# Patient Record
Sex: FEMALE | Race: WHITE
[De-identification: names, ages, dates, MRNs, and addresses within clinical notes are randomized per-mention and may not be internally consistent; named-entity substitution may affect disease eponyms.]

---

## 2017-01-09 ENCOUNTER — HOSPITAL ENCOUNTER (OUTPATIENT)
Dept: HOSPITAL 45 - C.LAB1850 | Age: 77
Discharge: HOME | End: 2017-01-09
Attending: FAMILY MEDICINE
Payer: COMMERCIAL

## 2017-01-09 DIAGNOSIS — E03.9: Primary | ICD-10-CM

## 2017-02-22 ENCOUNTER — HOSPITAL ENCOUNTER (EMERGENCY)
Dept: HOSPITAL 45 - C.EDB | Age: 77
Discharge: HOME | End: 2017-02-22
Payer: COMMERCIAL

## 2017-02-22 VITALS
BODY MASS INDEX: 25.56 KG/M2 | WEIGHT: 149.69 LBS | WEIGHT: 149.69 LBS | HEIGHT: 64.02 IN | HEIGHT: 64.02 IN | BODY MASS INDEX: 25.56 KG/M2

## 2017-02-22 VITALS — HEART RATE: 65 BPM | SYSTOLIC BLOOD PRESSURE: 142 MMHG | OXYGEN SATURATION: 97 % | DIASTOLIC BLOOD PRESSURE: 68 MMHG

## 2017-02-22 VITALS — OXYGEN SATURATION: 97 %

## 2017-02-22 VITALS — TEMPERATURE: 97.34 F
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
## 2017-02-22 DIAGNOSIS — Z98.1: ICD-10-CM

## 2017-02-22 DIAGNOSIS — S02.2XXA: Primary | ICD-10-CM

## 2017-02-22 DIAGNOSIS — Z79.899: ICD-10-CM

## 2017-02-22 DIAGNOSIS — E03.9: ICD-10-CM

## 2017-02-22 DIAGNOSIS — Z88.2: ICD-10-CM

## 2017-02-22 LAB
ALP SERPL-CCNC: 71 U/L (ref 45–117)
ALT SERPL-CCNC: 21 U/L (ref 12–78)
ANION GAP SERPL CALC-SCNC: 7 MMOL/L (ref 3–11)
AST SERPL-CCNC: 14 U/L (ref 15–37)
BASOPHILS # BLD: 0.03 K/UL (ref 0–0.2)
BASOPHILS NFR BLD: 0.4 %
BUN SERPL-MCNC: 10 MG/DL (ref 7–18)
BUN/CREAT SERPL: 13.5 (ref 10–20)
CALCIUM SERPL-MCNC: 8.7 MG/DL (ref 8.5–10.1)
CHLORIDE SERPL-SCNC: 105 MMOL/L (ref 98–107)
CKMB/CK RATIO: 1.1 (ref 0–3)
CO2 SERPL-SCNC: 27 MMOL/L (ref 21–32)
COMPLETE: YES
CREAT CL PREDICTED SERPL C-G-VRATE: 60.6 ML/MIN
CREAT SERPL-MCNC: 0.76 MG/DL (ref 0.6–1.2)
EOSINOPHIL NFR BLD AUTO: 400 K/UL (ref 130–400)
GLUCOSE SERPL-MCNC: 91 MG/DL (ref 70–99)
HCT VFR BLD CALC: 39.5 % (ref 37–47)
IG%: 0.3 %
IMM GRANULOCYTES NFR BLD AUTO: 24.7 %
LYMPHOCYTES # BLD: 1.84 K/UL (ref 1.2–3.4)
MCH RBC QN AUTO: 31.8 PG (ref 25–34)
MCHC RBC AUTO-ENTMCNC: 33.4 G/DL (ref 32–36)
MCV RBC AUTO: 95.2 FL (ref 80–100)
MONOCYTES NFR BLD: 6.6 %
NEUTROPHILS # BLD AUTO: 5.2 %
NEUTROPHILS NFR BLD AUTO: 62.8 %
PMV BLD AUTO: 9.1 FL (ref 7.4–10.4)
POTASSIUM SERPL-SCNC: 4 MMOL/L (ref 3.5–5.1)
RBC # BLD AUTO: 4.15 M/UL (ref 4.2–5.4)
SODIUM SERPL-SCNC: 139 MMOL/L (ref 136–145)
WBC # BLD AUTO: 7.44 K/UL (ref 4.8–10.8)

## 2017-02-22 NOTE — DIAGNOSTIC IMAGING REPORT
RIGHT FOREARM 2 VIEWS ROUTINE



CLINICAL HISTORY: Pt c/o Rt arm pain

Right pain



COMPARISON: None.



DISCUSSION: The bones and joint spaces appear intact. There is no evidence of

fracture, dislocation or bony disease. There is no evidence for soft tissue

swelling.



IMPRESSION: Negative study.







Electronically signed by:  Jad Bolden M.D.

2/22/2017 1:58 PM



Dictated Date/Time:  2/22/2017 1:57 PM

## 2017-02-22 NOTE — DIAGNOSTIC IMAGING REPORT
HEAD CT NONCONTRAST



CT DOSE: 720.95 mGycm



HISTORY: Trauma  Pt c/o fall



TECHNIQUE: Multiaxial CT images of the head were performed without the use of

intravenous contrast.



Comparison: None.



Findings: The paranasal sinuses and mastoid air cells are clear. The calvarium

and skull base are intact. The ventricles and sulci are within normal limits.

There is no mass, hematoma, midline shift, or acute infarct. Mild frontal

atrophy. Mild chronic small vessel change of aging



Impression:

No acute intracranial abnormality. . Age-related change.







Electronically signed by:  Jad Bolden M.D.

2/22/2017 1:09 PM



Dictated Date/Time:  2/22/2017 1:07 PM

## 2017-02-22 NOTE — EMERGENCY ROOM VISIT NOTE
History


Report prepared by Cristalibroselyn:  Kelvin Rust


Under the Supervision of:  Dr. Williams Walter M.D.


First contact with patient:  11:51


Chief Complaint:  FALL


Stated Complaint:  FELL, NOSE AND RT ARM PAIN





History of Present Illness


The patient is a 76 year old female who presents to the Emergency Room with 

complaints of an acute fall that occurred just prior to arrival. The patient 

was walking her dog when she fell. She is not sure if she tripped and notes 

that she may have passed out. The patient hit her nose, which is now swollen. 

She is able to breath through her nose. She also complains of a headache and 

right arm pain. She denies right upper extremity numbness. The patient is not 

on any blood thinners.





   Source of History:  patient


   Onset:  prior to arrival


   Position:  other (global)


   Quality:  other (fall)


   Timing:  other (acute)


   Associated Symptoms:  + headache, No numbness





Review of Systems


See HPI for pertinent positives & negatives. A total of 10 systems reviewed and 

were otherwise negative.





Past Medical & Surgical


Medical Problems:


(1) Hypothyroidism


Surgical Problems:


(1) H/O spinal fusion








Family History





No pertinent family history





Social History


Smoking Status:  Never Smoker


Marital Status:  


Housing Status:  lives with family





Current/Historical Medications


Scheduled


Calcium Carbonate-Vitamin D (Calcium), 1 TAB PO QAM


Cholecalciferol (Vitamin D), 1 TAB PO QAM


Cyanocobalamin (Vitamin B-12), 1 TAB PO QAM


Levothyroxine Sodium (Levothyroxine Sodium), 1 TAB PO QAM





Scheduled PRN


Meloxicam (Mobic), 7.5 MG PO DAILY PRN for BACK PAIN





Allergies


Coded Allergies:  


     Sulfa Antibiotics (Verified  Adverse Reaction, Unknown, HIVES, 2/22/17)


Uncoded Allergies:  


     SULFA (Allergy, Unknown, 9/3/04)





Physical Exam


Vital Signs











  Date Time  Temp Pulse Resp B/P Pulse Ox O2 Delivery O2 Flow Rate FiO2


 


2/22/17 14:39  65 18 142/68 97   


 


2/22/17 12:34  62      


 


2/22/17 12:24  67 16 148/63 97 Room Air  


 


2/22/17 12:19     97 Room Air  


 


2/22/17 12:19     97 Room Air  


 


2/22/17 11:35 36.3 78 18 135/81 92 Room Air  











Physical Exam


GENERAL: Patient is a healthy-appearing well-nourished


HEAD: Normocephalic atraumatic


EYES: Ocular movements intact pupils equal and react to light


OROPHARYNX mucous membranes are moist no exudates present no erythema or edema 

present


NECK: Supple no nuchal rigidity


CHEST: Good equal expansion


LUNGS: Clear and equal to auscultation


CARDIAC: Normal S1 and S2


ABDOMEN: Soft nontender no guarding


BACK: No CVA tenderness


EXTREMITIES: No pain upon palpation normal muscle strength in all groups no 

clubbing cyanosis or edema


NEURO: Patient is following commands is answering questions appropriately. 

Alert and oriented x3 Cranial Nerves 2-12 grossly intact





Medical Decision & Procedures


ER Provider


Diagnostic Interpretation:


Radiology results as stated below per my review and radiologist interpretation:





RIGHT FOREARM 2 VIEWS ROUTINE





CLINICAL HISTORY: Pt c/o Rt arm pain


Right pain





COMPARISON: None.





DISCUSSION: The bones and joint spaces appear intact. There is no evidence of


fracture, dislocation or bony disease. There is no evidence for soft tissue


swelling.





IMPRESSION: Negative study.











Electronically signed by:  Jad Bolden M.D.


2/22/2017 1:58 PM





Dictated Date/Time:  2/22/2017 1:57 PM








HEAD CT NONCONTRAST





CT DOSE: 720.95 mGycm





HISTORY: Trauma  Pt c/o fall





TECHNIQUE: Multiaxial CT images of the head were performed without the use of


intravenous contrast.





Comparison: None.





Findings: The paranasal sinuses and mastoid air cells are clear. The calvarium


and skull base are intact. The ventricles and sulci are within normal limits.


There is no mass, hematoma, midline shift, or acute infarct. Mild frontal


atrophy. Mild chronic small vessel change of aging





Impression:


No acute intracranial abnormality. . Age-related change.











Electronically signed by:  Jad Bolden M.D.


2/22/2017 1:09 PM





Dictated Date/Time:  2/22/2017 1:07 PM








MAXILLOFACIAL CT





CT DOSE: 551.90 mGycm





HISTORY: Trauma  Pt c/o nose fx





TECHNIQUE: Multiaxial CT images of the maxillofacial region were performed and


reformatted in the coronal plane without the use of contrast.





COMPARISON:  None.





FINDINGS: Nondisplaced cortical fracture tip nasal bones. All remaining osseous


structures are unremarkable. Major sinuses are clear. The orbital margins are


intact.





IMPRESSION:  


Nondisplaced cortical fracture tip nasal bones. Study is otherwise negative.











Electronically signed by:  Jad Bolden M.D.


2/22/2017 1:11 PM





Dictated Date/Time:  2/22/2017 1:09 PM





Laboratory Results


2/22/17 12:22








Red Blood Count 4.15, Mean Corpuscular Volume 95.2, Mean Corpuscular Hemoglobin 

31.8, Mean Corpuscular Hemoglobin Concent 33.4, Mean Platelet Volume 9.1, 

Neutrophils (%) (Auto) 62.8, Lymphocytes (%) (Auto) 24.7, Monocytes (%) (Auto) 

6.6, Eosinophils (%) (Auto) 5.2, Basophils (%) (Auto) 0.4, Neutrophils # (Auto) 

4.67, Lymphocytes # (Auto) 1.84, Monocytes # (Auto) 0.49, Eosinophils # (Auto) 

0.39, Basophils # (Auto) 0.03





2/22/17 12:22

















Test


  2/22/17


12:16 2/22/17


12:22


 


Bedside Glucose


  80 mg/dl


(70-90) 


 


 


White Blood Count


  


  7.44 K/uL


(4.8-10.8)


 


Red Blood Count


  


  4.15 M/uL


(4.2-5.4)


 


Hemoglobin


  


  13.2 g/dL


(12.0-16.0)


 


Hematocrit  39.5 % (37-47) 


 


Mean Corpuscular Volume


  


  95.2 fL


()


 


Mean Corpuscular Hemoglobin


  


  31.8 pg


(25-34)


 


Mean Corpuscular Hemoglobin


Concent 


  33.4 g/dl


(32-36)


 


Platelet Count


  


  400 K/uL


(130-400)


 


Mean Platelet Volume


  


  9.1 fL


(7.4-10.4)


 


Neutrophils (%) (Auto)  62.8 % 


 


Lymphocytes (%) (Auto)  24.7 % 


 


Monocytes (%) (Auto)  6.6 % 


 


Eosinophils (%) (Auto)  5.2 % 


 


Basophils (%) (Auto)  0.4 % 


 


Neutrophils # (Auto)


  


  4.67 K/uL


(1.4-6.5)


 


Lymphocytes # (Auto)


  


  1.84 K/uL


(1.2-3.4)


 


Monocytes # (Auto)


  


  0.49 K/uL


(0.11-0.59)


 


Eosinophils # (Auto)


  


  0.39 K/uL


(0-0.5)


 


Basophils # (Auto)


  


  0.03 K/uL


(0-0.2)


 


RDW Standard Deviation


  


  47.1 fL


(36.4-46.3)


 


RDW Coefficient of Variation


  


  13.6 %


(11.5-14.5)


 


Immature Granulocyte % (Auto)  0.3 % 


 


Immature Granulocyte # (Auto)


  


  0.02 K/uL


(0.00-0.02)


 


Anion Gap


  


  7.0 mmol/L


(3-11)


 


Est Creatinine Clear Calc


Drug Dose 


  60.6 ml/min 


 


 


Estimated GFR (


American) 


  88.9 


 


 


Estimated GFR (Non-


American 


  76.7 


 


 


BUN/Creatinine Ratio  13.5 (10-20) 


 


Calcium Level


  


  8.7 mg/dl


(8.5-10.1)


 


Total Bilirubin


  


  0.4 mg/dl


(0.2-1)


 


Direct Bilirubin


  


  < 0.1 mg/dl


(0-0.2)


 


Aspartate Amino Transf


(AST/SGOT) 


  14 U/L (15-37) 


 


 


Alanine Aminotransferase


(ALT/SGPT) 


  21 U/L (12-78) 


 


 


Alkaline Phosphatase


  


  71 U/L


()


 


Total Creatine Kinase


  


  180 U/L


()


 


Creatine Kinase MB


  


  2.0 ng/ml


(0.5-3.6)


 


Creatine Kinase MB Ratio  1.1 (0-3.0) 


 


Troponin I


  


  < 0.015 ng/ml


(0-0.045)


 


Total Protein


  


  7.4 gm/dl


(6.4-8.2)


 


Albumin


  


  3.8 gm/dl


(3.4-5.0)





Labs reviewed by ED physician.





Medications Administered











 Medications


  (Trade)  Dose


 Ordered  Sig/Elin


 Route  Start Time


 Stop Time Status Last Admin


Dose Admin


 


 Sodium Chloride


  (Nss 1000ml)  1,000 ml @ 


 999 mls/hr  Q1H1M STAT


 IV  2/22/17 12:00


 2/22/17 13:00 DC 2/22/17 12:24


999 MLS/HR











ECG


Indication:  other (fall)


Rate (beats per minute):  61


Rhythm:  normal sinus


Findings:  no acute ischemic change, no ectopy, other (normal EKG)





ED Course


1155: Past medical records reviewed. The patient was evaluated in room A12b. A 

complete history and physical examination was performed. 





1200: NSS 1000 ml @ 999 mls/hr.





1410: Reassessed the patient. Discussed the findings with her. She verbalized 

understanding and agreement of the treatment plan. The patient is ready for 

discharge.





Medical Decision


Differential diagnosis:


Etiologies such as fracture, dislocation, intra-abdominal, pneumothorax, 

intrathoracic , intracranial, neurologic, as well as other traumatic 

pathologies were entertained.





This is a 76-year-old female who presents emergency department after fall at 

home.  Patient believes she tripped over uneven sidewalk.  The patient given 

normal saline bolus.  She is requesting ibuprofen in the emergency department 

which she was given.  She does not appear to have any fracture to her right arm 

however she was placed in a sling due to continued pain.  In addition the 

patient also has nasal bone fractures.  I discussed following up with ENT for 

the nasal bone fractures with the patient as well as orthopedics for her arm.  

Patient was in agreement with the treatment plan.





Impression





 Primary Impression:  


 Fall


 Additional Impressions:  


 Right forearm pain


 Nasal fracture





Scribe Attestation


The scribe's documentation has been prepared under my direction and personally 

reviewed by me in its entirety. I confirm that the note above accurately 

reflects all work, treatment, procedures, and medical decision making performed 

by me.





Departure Information


Dispostion


Home / Self-Care





Referrals


Shaneka Magallon DO (PCP)





Forms


HOME CARE DOCUMENTATION FORM,                                                 

               IMPORTANT VISIT INFORMATION, School Instructions,       


   Work Instructions





Patient Instructions


Broken Nose - [facility], ED Contusion Upper Ext, My [facility]





Additional Instructions





Follow up with DR Hawkins's office


Follow up with DR Redding's office








Take 600 mg Ibuprofen every 6 hours














You have been examined and treated today on an emergency basis only. This is 

not a substitute for, or an effort to provide, complete comprehensive medical 

care. It is impossible to recognize and treat all injuries or illnesses in a 

single emergency department visit. It is therefore important that you follow up 

closely with Dr Magallon.  Call as soon as possible for an appointment.  





Thank you for your time and consideration.  I look forward to speaking with you 

again soon.  Please don't hesitate to call us if you have any questions.





Problem Qualifiers








 Primary Impression:  


 Fall


 Encounter type:  initial encounter  Qualified Codes:  W19.XXXA - Unspecified 

fall, initial encounter


 Additional Impressions:  


 Nasal fracture


 Encounter type:  initial encounter  Fracture type:  closed  Qualified Codes:  

S02.2XXA - Fracture of nasal bones, initial encounter for closed fracture

## 2017-02-22 NOTE — DIAGNOSTIC IMAGING REPORT
MAXILLOFACIAL CT



CT DOSE: 551.90 mGycm



HISTORY: Trauma  Pt c/o nose fx



TECHNIQUE: Multiaxial CT images of the maxillofacial region were performed and

reformatted in the coronal plane without the use of contrast.



COMPARISON:  None.



FINDINGS: Nondisplaced cortical fracture tip nasal bones. All remaining osseous

structures are unremarkable. Major sinuses are clear. The orbital margins are

intact.



IMPRESSION:  

Nondisplaced cortical fracture tip nasal bones. Study is otherwise negative.







Electronically signed by:  Jad Bolden M.D.

2/22/2017 1:11 PM



Dictated Date/Time:  2/22/2017 1:09 PM

## 2017-04-20 ENCOUNTER — HOSPITAL ENCOUNTER (EMERGENCY)
Dept: HOSPITAL 45 - C.EDB | Age: 77
Discharge: HOME | End: 2017-04-20
Payer: COMMERCIAL

## 2017-04-20 VITALS
WEIGHT: 153 LBS | BODY MASS INDEX: 26.12 KG/M2 | WEIGHT: 153 LBS | HEIGHT: 64.02 IN | BODY MASS INDEX: 26.12 KG/M2 | HEIGHT: 64.02 IN

## 2017-04-20 VITALS — DIASTOLIC BLOOD PRESSURE: 72 MMHG | HEART RATE: 78 BPM | SYSTOLIC BLOOD PRESSURE: 128 MMHG | OXYGEN SATURATION: 97 %

## 2017-04-20 VITALS — TEMPERATURE: 98.24 F

## 2017-04-20 DIAGNOSIS — Z88.2: ICD-10-CM

## 2017-04-20 DIAGNOSIS — Z98.1: ICD-10-CM

## 2017-04-20 DIAGNOSIS — Z87.891: ICD-10-CM

## 2017-04-20 DIAGNOSIS — S73.101A: ICD-10-CM

## 2017-04-20 DIAGNOSIS — Z79.899: ICD-10-CM
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
## 2017-04-20 DIAGNOSIS — T14.8: ICD-10-CM

## 2017-04-20 DIAGNOSIS — S93.401A: Primary | ICD-10-CM

## 2017-04-20 DIAGNOSIS — E03.9: ICD-10-CM

## 2017-04-20 NOTE — DIAGNOSTIC IMAGING REPORT
RIGHT ANKLE MIN 3 VIEWS ROUTINE



CLINICAL HISTORY: fall, right ankle lateral pain/swelling

Right trauma. Pain. Findings of



COMPARISON: None.



DISCUSSION: Findings of an old healed fracture of the distal fibular shaft. No

acute bony abnormality. Ankle mortise is aligned anatomically. Cortical margins

are intact. There is no evidence for soft tissue swelling.



IMPRESSION: No acute bony abnormality.







Electronically signed by:  Jad Bolden M.D.

4/20/2017 10:08 AM



Dictated Date/Time:  4/20/2017 10:08 AM

## 2017-04-20 NOTE — EMERGENCY ROOM VISIT NOTE
History


Report prepared by Dary:  Sophia Alfonso


Under the Supervision of:  Dr. Zachary Michaels M.D.


First contact with patient:  09:26


Chief Complaint:  FALL


Stated Complaint:  FALL-RIGHT ANKLE, KNEE AND HIP





History of Present Illness


The patient is a 76 year old female who presents to the Emergency Room with 

complaints of a sudden fall that occurred just prior to arrival.  She currently 

rates her discomfort as a 3/10 in severity.  The patient states that today she 

was walking too fast and caught her shoe on the ground causing the fall.  She 

states that the same thing happened two months ago with the same pair of shoes.

  The patient states that she was able to get up and walk after the fall.  The 

patient notes that she twisted her right ankle.  She associates right ankle pain

, right knee pain, and right hip pain with her symptoms today.  The patient 

denies hitting her head.  She denies any headache, neck pain, lightheadedness, 

or chest pain.  The patient denies being on blood thinners.  She denies any 

recent illnesses.





   Source of History:  patient


   Onset:  prior to arrival


   Position:  other (global)


   Symptom Intensity:  3/10


   Quality:  other (fall)


   Timing:  other (sudden)


   Associated Symptoms:  No chest pain, No headache, No neck pain


Note:


Associated Symptoms: right ankle pain, right knee pain, right hip pain





Review of Systems


See HPI for pertinent positives & negatives. A total of 6 systems reviewed and 

were otherwise negative.





Past Medical & Surgical


Medical Problems:


(1) Hypothyroidism


Surgical Problems:


(1) H/O spinal fusion








Family History





No pertinent family history





Social History


Smoking Status:  Former Smoker


Marital Status:  


Housing Status:  lives with family


Occupation Status:  retired





Current/Historical Medications


Scheduled


Calcium Carbonate-Vitamin D (Calcium), 1 TAB PO QAM


Cholecalciferol (Vitamin D), 1 TAB PO QAM


Cyanocobalamin (Vitamin B-12), 1 TAB PO QAM


Levothyroxine Sodium (Levothyroxine Sodium), 1 TAB PO QAM


Lorazepam (Ativan), 0.5 MG PO PRN





Scheduled PRN


Meloxicam (Mobic), 7.5 MG PO DAILY PRN for BACK PAIN





Allergies


Coded Allergies:  


     Sulfa Antibiotics (Verified  Adverse Reaction, Unknown, HIVES, 4/20/17)





Physical Exam


Vital Signs











  Date Time  Temp Pulse Resp B/P Pulse Ox O2 Delivery O2 Flow Rate FiO2


 


4/20/17 10:23  78 16 128/72 97   


 


4/20/17 09:18 36.8 81 18 132/73 93 Room Air  











Physical Exam


GENERAL: Patient is well appearing and in minimal distress.


HEENT: No acute trauma, normocephalic atraumatic, mucous membranes moist, no 

nasal congestion, no scleral icterus.


NECK: No stridor, no adenopathy, no meningismus, trachea is midline.


LUNGS: No dyspnea. Clear to auscultation and equal bilaterally. No wheeze, no 

rhonchi.


HEART: Regular rate and rhythm.  No murmurs, rubs, gallops appreciated.


ABDOMEN: Soft, nontender, bowel sounds positive, no masses appreciated, no 

peritonitis.


BACK: Scaring of low back from previous surgery.  No tenderness. No midline 

tenderness, no CVA tenderness


EXTREMITIES: Mild swelling below right ankle of the lateral malleolus with mild 

tenderness to palpation.  Mild tenderness to palpation over right posterior hip.


NEUROLOGIC: Alert and oriented, no acute motor or sensory deficits, no focal 

weakness, cranial nerves grossly intact.


SKIN: No rash, no jaundice, no diaphoresis.





Medical Decision & Procedures


ER Provider


Diagnostic Interpretation:


X ray results are stated below per my interpretation and the radiologist's 

interpretation. 





RIGHT HIP 2 VIEWS





CLINICAL HISTORY: Fall with right hip pain.





FINDINGS: AP and frog-leg views of the right hip are compared to study dated


11/20/2014. The skeletal structures are osteopenic. There is no radiographic


evidence of fracture in the right hip or the imaged right hemipelvis. Only


minimal arthritic change is observed. Small enthesophytes arise from the right


anterior superior iliac spine and the greater trochanter of the right femur.


Sclerotic change is noted in the right sacroiliac joint. The overlying soft


tissues are within normal limits. Pelvic phlebolith are identified.





IMPRESSION: Osteopenia and mild arthritic change as above. No fracture is seen


in the right hip.





Electronically signed by:  Christian Murillo M.D.


4/20/2017 10:11 AM





Dictated Date/Time:  4/20/2017 10:10 AM








RIGHT ANKLE MIN 3 VIEWS ROUTINE





CLINICAL HISTORY: fall, right ankle lateral pain/swelling


Right trauma. Pain. Findings of





COMPARISON: None.





DISCUSSION: Findings of an old healed fracture of the distal fibular shaft. No


acute bony abnormality. Ankle mortise is aligned anatomically. Cortical margins


are intact. There is no evidence for soft tissue swelling.





IMPRESSION: No acute bony abnormality.





Electronically signed by:  Jad Bolden M.D.


4/20/2017 10:08 AM





Dictated Date/Time:  4/20/2017 10:08 AM





ED Course


0929: The patient was evaluated in room B10. A complete history and physical 

exam was performed.





1015: I reevaluated the patient and she is resting comfortably.  I discussed 

all the exam findings with her and I discussed the treatment plan.  She 

verbalized complete understanding and agreement. She is ready for discharge.





Medical Decision


76 yr old female arrives for evaluation of right leg pain s/p fall.  No head 

injury nor headache nor blood thinner use and she has no neuro deficits.  She 

does not require CT head/neck.  Hip/ankle sore with negative imaging.  Likely 

contusion/sprain.  N/V intact.  Able to ambulate.  Discussed RICE and PRN nsaids

/tylenol.  Discussed likely more sore tomorrow and if significantly worse RTED.





Impression





 Primary Impression:  


 Fall


 Additional Impressions:  


 Contusion of multiple sites


 Right ankle sprain


 Strain of right hip





Scribe Attestation


The scribe's documentation has been prepared under my direction and personally 

reviewed by me in its entirety. I confirm that the note above accurately 

reflects all work, treatment, procedures, and medical decision making performed 

by me.





Departure Information


Dispostion


Home / Self-Care





Referrals


Shaneka Magallon DO (PCP)





Forms


HOME CARE DOCUMENTATION FORM,                                                 

               IMPORTANT VISIT INFORMATION





Patient Instructions


Ankle Sprain, My [facility]





Problem Qualifiers








 Primary Impression:  


 Fall


 Encounter type:  initial encounter  Qualified Codes:  W19.XXXA - Unspecified 

fall, initial encounter


 Additional Impressions:  


 Right ankle sprain


 Encounter type:  initial encounter  Involved ligament of ankle:  unspecified 

ligament  Qualified Codes:  S93.401A - Sprain of unspecified ligament of right 

ankle, initial encounter


 Strain of right hip


 Encounter type:  initial encounter  Qualified Codes:  S76.011A - Strain of 

muscle, fascia and tendon of right hip, initial encounter

## 2017-04-20 NOTE — DIAGNOSTIC IMAGING REPORT
RIGHT HIP 2 VIEWS



CLINICAL HISTORY: Fall with right hip pain.



FINDINGS: AP and frog-leg views of the right hip are compared to study dated

11/20/2014. The skeletal structures are osteopenic. There is no radiographic

evidence of fracture in the right hip or the imaged right hemipelvis. Only

minimal arthritic change is observed. Small enthesophytes arise from the right

anterior superior iliac spine and the greater trochanter of the right femur.

Sclerotic change is noted in the right sacroiliac joint. The overlying soft

tissues are within normal limits. Pelvic phlebolith are identified.



IMPRESSION: Osteopenia and mild arthritic change as above. No fracture is seen

in the right hip.







Electronically signed by:  Christian Murillo M.D.

4/20/2017 10:11 AM



Dictated Date/Time:  4/20/2017 10:10 AM

## 2017-09-15 ENCOUNTER — HOSPITAL ENCOUNTER (OUTPATIENT)
Dept: HOSPITAL 45 - C.MAMM | Age: 77
Discharge: HOME | End: 2017-09-15
Attending: OBSTETRICS & GYNECOLOGY
Payer: COMMERCIAL

## 2017-09-15 DIAGNOSIS — Z12.31: Primary | ICD-10-CM

## 2017-09-15 NOTE — MAMMOGRAPHY REPORT
BILATERAL DIGITAL SCREENING MAMMOGRAM WITH CAD: 9/15/2017

CLINICAL HISTORY: Routine screening.  Patient has no complaints.  





TECHNIQUE:  Current study was also evaluated with a Computer Aided Detection (CAD) system.  Bilateral
 CC and MLO views were obtained.



COMPARISON: Comparison is made to exams dated:  9/12/2016 mammogram, 9/8/2015 mammogram, 9/4/2014 sandra
mogram, 9/3/2013 mammogram, 6/25/2012 ultrasound, and 6/25/2012 mammogram - [facility].   



BREAST COMPOSITION:  There are scattered areas of fibroglandular density in both breasts.  



FINDINGS:  No suspicious masses, calcifications, or areas of architectural distortion are noted in ei
ther breast. There has been no significant interval change compared to prior exams.  There are stable
 postsurgical changes in the left upper outer quadrant.



IMPRESSION:  ACR BI-RADS CATEGORY 2: BENIGN

There is no mammographic evidence of malignancy. A 1 year screening mammogram is recommended.  The pa
tient will receive written notification of the results.  





Approximately 10% of breast cancers are not detected with mammography. A negative mammographic report
 should not delay biopsy if a clinically suggestive mass is present.



Christina Jane M.D.          

/:9/15/2017 14:39:42  



Imaging Technologist: Kira Hernandez, [facility]

letter sent: Normal 1/2  

BI-RADS Code: ACR BI-RADS Category 2: Benign

## 2018-04-05 ENCOUNTER — HOSPITAL ENCOUNTER (OUTPATIENT)
Dept: HOSPITAL 45 - C.LAB | Age: 78
Discharge: HOME | End: 2018-04-05
Attending: PHYSICIAN ASSISTANT
Payer: COMMERCIAL

## 2018-04-05 DIAGNOSIS — M81.0: Primary | ICD-10-CM

## 2018-04-05 LAB
CALCIUM SERPL-MCNC: 9.7 MG/DL (ref 8.5–10.1)
CREAT SERPL-MCNC: 0.77 MG/DL (ref 0.6–1.2)